# Patient Record
Sex: FEMALE | Race: WHITE | NOT HISPANIC OR LATINO | Employment: FULL TIME | URBAN - METROPOLITAN AREA
[De-identification: names, ages, dates, MRNs, and addresses within clinical notes are randomized per-mention and may not be internally consistent; named-entity substitution may affect disease eponyms.]

---
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
## 2019-07-14 ENCOUNTER — HOSPITAL ENCOUNTER (EMERGENCY)
Facility: HOSPITAL | Age: 19
Discharge: HOME | End: 2019-07-14
Attending: EMERGENCY MEDICINE | Admitting: EMERGENCY MEDICINE

## 2019-07-14 VITALS
BODY MASS INDEX: 42.94 KG/M2 | DIASTOLIC BLOOD PRESSURE: 96 MMHG | SYSTOLIC BLOOD PRESSURE: 152 MMHG | OXYGEN SATURATION: 98 % | RESPIRATION RATE: 16 BRPM | HEIGHT: 64 IN | HEART RATE: 49 BPM | WEIGHT: 251.5 LBS | TEMPERATURE: 98.2 F

## 2019-07-14 DIAGNOSIS — S39.012A STRAIN OF LUMBAR REGION, INITIAL ENCOUNTER: Primary | ICD-10-CM

## 2019-07-14 PROCEDURE — 99282 EMERGENCY DEPT VISIT SF MDM: CPT

## 2019-07-14 PROCEDURE — 99282 EMERGENCY DEPT VISIT SF MDM: CPT | Performed by: EMERGENCY MEDICINE

## 2019-07-14 RX ORDER — NABUMETONE 500 MG/1
500 TABLET, FILM COATED ORAL 2 TIMES DAILY PRN
Qty: 14 TABLET | Refills: 0 | Status: SHIPPED | OUTPATIENT
Start: 2019-07-14 | End: 2019-07-21

## 2019-07-14 RX ORDER — METHOCARBAMOL 750 MG/1
1500 TABLET, FILM COATED ORAL 3 TIMES DAILY PRN
Qty: 30 TABLET | Refills: 0 | Status: SHIPPED | OUTPATIENT
Start: 2019-07-14 | End: 2019-07-19

## 2019-07-14 NOTE — ED PROVIDER NOTES
Subjective   Bud Murray is a 18 yo WF who presents secondary to back injury.  Patient works at [employer].  She was pulling a cart of batteries behind her when she felt pain in her low back.  The pain continues to present.  It does not radiate down her legs.  Patient presents for evaluation.        History provided by:  Patient  Back Pain   Location:  Lumbar spine  Quality:  Aching  Radiates to:  Does not radiate  Pain severity:  Moderate  Onset quality:  Sudden  Duration:  1 hour  Timing:  Constant  Progression:  Unchanged  Chronicity:  New  Context comment:  As described above.  Relieved by:  None tried  Worsened by:  Bending  Ineffective treatments:  None tried  Associated symptoms: no abdominal pain, no bladder incontinence, no bowel incontinence, no chest pain, no dysuria, no fever, no headaches, no leg pain, no numbness, no paresthesias, no tingling and no weakness    Risk factors: lack of exercise and obesity    Risk factors: no hx of cancer, no hx of osteoporosis, not pregnant and no steroid use        Review of Systems   Constitutional: Negative for fever.   HENT: Negative for rhinorrhea.    Respiratory: Negative for cough.    Cardiovascular: Negative for chest pain.   Gastrointestinal: Negative for abdominal pain and bowel incontinence.   Genitourinary: Negative for bladder incontinence and dysuria.   Musculoskeletal: Positive for back pain.   Skin: Negative for color change.   Allergic/Immunologic: Negative for immunocompromised state.   Neurological: Negative for tingling, weakness, numbness, headaches and paresthesias.   All other systems reviewed and are negative.      Past Medical History:   Diagnosis Date   • Asthma    • History of gonorrhea    • Migraine    • Social anxiety disorder        No Known Allergies    Past Surgical History:   Procedure Laterality Date   • WISDOM TOOTH EXTRACTION         Family History   Problem Relation Age of Onset   • Stroke Father    • Hypertension Father    • Heart  disease Father    • Diabetes Father    • Ovarian cysts Mother    • Hypertension Mother    • Bipolar disorder Sister    • Heart disease Paternal Grandfather    • Heart disease Paternal Grandmother    • Bipolar disorder Paternal Grandmother    • Colon cancer Maternal Grandmother 63   • Heart disease Maternal Grandmother    • Heart disease Maternal Grandfather    • Breast cancer Maternal Aunt 24   • Ovarian cancer Other    • Ovarian cancer Other        Social History     Socioeconomic History   • Marital status: Single     Spouse name: Not on file   • Number of children: 0   • Years of education: Not on file   • Highest education level: Not on file   Tobacco Use   • Smoking status: Passive Smoke Exposure - Never Smoker   • Smokeless tobacco: Never Used   Substance and Sexual Activity   • Alcohol use: No   • Drug use: No   • Sexual activity: Not Currently     Birth control/protection: None   Social History Narrative    New gyn patient 8/8/18.           Objective   Physical Exam   Constitutional: She is oriented to person, place, and time. She appears well-developed and well-nourished. No distress.   19-year-old white female sitting in bed.  Patient is morbidly obese.  She appears in fair overall health.  Vital signs notable for blood pressure of 142/89 and pulse of 47.  Patient is accompanied by her mother.   HENT:   Head: Normocephalic and atraumatic.   Musculoskeletal:        Lumbar back: She exhibits decreased range of motion. She exhibits no tenderness, no bony tenderness, no swelling, no edema, no deformity and no spasm.   Neurological: She is alert and oriented to person, place, and time.   Skin: Skin is warm and dry. She is not diaphoretic.   Psychiatric: She has a normal mood and affect. Her behavior is normal.   Nursing note and vitals reviewed.      Procedures           ED Course  ED Course as of Jul 14 0705   Sun Jul 14, 2019   0353 Patient has not taken any Tylenol or ibuprofen for her low back pain.   "Patient's mother states that this was a work injury and that immediately after injury \"she came right here\".  The patient is nontender to palpation.  X-rays not indicated.  Will treat with NSAIDs and muscle relaxants.  [SS]      ED Course User Index  [SS] Puneet Meyer MD      My differential diagnosis for back pain includes but is not limited to:  Musculoskeletal strain, contusion, retroperitoneal hematoma, disc protrusion, vertebral fracture, transverse process fracture, rib fracture, facet syndrome, sacroiliac joint strain, sciatica, renal injury, splenic injury, pancreatic injury, osteoarthritis, lumbar spondylosis, spinal stenosis, ankylosing spondylitis, sacroiliac joint inflammation, pancreatitis, perforated peptic ulcer, diverticulitis, endometriosis, chronic PID, epidural abscess, osteomyelitis, retroperitoneal abscess, pyelonephritis, pneumonia, subphrenic abscess, tuberculosis, neurofibroma, meningioma, multiple myeloma, lymphoma, metastatic cancer, primary cancer, AAA, aortic dissection, spinal ischemia, referred pain, ureterolithiasis              MDM  Number of Diagnoses or Management Options  Strain of lumbar region, initial encounter: new and does not require workup  Risk of Complications, Morbidity, and/or Mortality  Presenting problems: low  Diagnostic procedures: minimal  Management options: moderate    Patient Progress  Patient progress: stable        Final diagnoses:   Strain of lumbar region, initial encounter            Puneet Meyer MD  07/14/19 0705    "

## 2019-07-14 NOTE — DISCHARGE INSTRUCTIONS
Medication as directed.  Apply heat to affected area.  Gentle stretching.  Follow-up with your PCP or spine specialist as above, sooner if needed.  Return to ED for worsening symptoms, medical emergencies.

## 2019-08-28 ENCOUNTER — OFFICE VISIT (OUTPATIENT)
Dept: OBSTETRICS AND GYNECOLOGY | Age: 19
End: 2019-08-28

## 2019-08-28 VITALS
BODY MASS INDEX: 41.66 KG/M2 | WEIGHT: 244 LBS | SYSTOLIC BLOOD PRESSURE: 130 MMHG | HEIGHT: 64 IN | DIASTOLIC BLOOD PRESSURE: 70 MMHG

## 2019-08-28 DIAGNOSIS — Z00.00 WELL WOMAN EXAM WITHOUT GYNECOLOGICAL EXAM: Primary | ICD-10-CM

## 2019-08-28 PROCEDURE — 99395 PREV VISIT EST AGE 18-39: CPT | Performed by: NURSE PRACTITIONER

## 2019-08-28 RX ORDER — NORETHINDRONE ACETATE AND ETHINYL ESTRADIOL 1; .02 MG/1; MG/1
1 TABLET ORAL DAILY
Qty: 90 TABLET | Refills: 1 | Status: SHIPPED | OUTPATIENT
Start: 2019-08-28 | End: 2020-01-22

## 2019-08-28 RX ORDER — NORETHINDRONE ACETATE AND ETHINYL ESTRADIOL 1; .02 MG/1; MG/1
1 TABLET ORAL DAILY
COMMUNITY
End: 2019-08-28 | Stop reason: SDUPTHER

## 2019-08-28 NOTE — PROGRESS NOTES
[facility]  Routine Annual Visit    2019    Patient: Bud Murray          MR#:[id number]      History of Present Illness    19 y.o. female  who presents with her mother for annual exam and for refill of oral contraceptives. Bud reports periods overall have greatly improved on the pill- her mother confirms this as well. Periods are now 3 days and light bleeding. Cramping has improved although is still moderate to severe first day of menses. Bud denies new medical hx. She does report her weight has fluctuated over the past yea- she is actively trying to lose weight and has had success over the past several months. She reports cutting out sodas and watching diet overall. She admits to little to no physical activity outside of work. Adding 30 minutes of physical activity 3-5 times per week was highly encouraged for weight loss and overall health and well being. Bud denies any past or current sexual activity.     One elevated BP populated in chart- this was taken while in ER with a back injury. Bud and her mother deny hx hypertension.     Patient's last menstrual period was 2019.  Obstetric History:  OB History      Para Term  AB Living    0 0 0 0 0 0    SAB TAB Ectopic Molar Multiple Live Births    0 0 0 0 0 0         Menstrual History:     Patient's last menstrual period was 2019.  Dysmenorrhea: (!) Severe    Sexual History:       ________________________________________  Patient Active Problem List   Diagnosis   • Obesity (BMI 30-39.9)   • Abnormal uterine bleeding (AUB)   • Dysmenorrhea   • PCOS (polycystic ovarian syndrome)       Past Medical History:   Diagnosis Date   • Asthma    • History of gonorrhea    • Migraine    • Social anxiety disorder        Past Surgical History:   Procedure Laterality Date   • WISDOM TOOTH EXTRACTION         Social History     Tobacco Use   Smoking Status Passive Smoke Exposure - Never Smoker   Smokeless Tobacco  Never Used       Family History   Problem Relation Age of Onset   • Stroke Father    • Hypertension Father    • Heart disease Father    • Diabetes Father    • Ovarian cysts Mother    • Hypertension Mother    • Bipolar disorder Sister    • Heart disease Paternal Grandfather    • Heart disease Paternal Grandmother    • Bipolar disorder Paternal Grandmother    • Colon cancer Maternal Grandmother 63   • Heart disease Maternal Grandmother    • Heart disease Maternal Grandfather    • Breast cancer Maternal Aunt 24   • Ovarian cancer Other    • Ovarian cancer Other        Prior to Admission medications    Medication Sig Start Date End Date Taking? Authorizing Provider   norethindrone-ethinyl estradiol (JUNEL 1/20) 1-20 MG-MCG per tablet Take 1 tablet by mouth Daily.   Yes Provider, Janeen, MD     The following portions of the patient's history were reviewed and updated as appropriate: allergies, current medications, past family history, past medical history, past social history, past surgical history and problem list.    Review of Systems   Constitutional: Negative.    HENT: Negative.    Eyes: Negative for visual disturbance.   Respiratory: Negative for cough, shortness of breath and wheezing.    Cardiovascular: Negative for chest pain, palpitations and leg swelling.   Gastrointestinal: Negative for abdominal distention, abdominal pain, blood in stool, constipation, diarrhea, nausea and vomiting.   Endocrine: Negative for cold intolerance and heat intolerance.   Genitourinary: Negative for difficulty urinating, dyspareunia, dysuria, frequency, genital sores, hematuria, menstrual problem, pelvic pain, urgency, vaginal bleeding, vaginal discharge and vaginal pain.   Musculoskeletal: Negative.    Skin: Negative.    Neurological: Negative for dizziness, weakness, light-headedness, numbness and headaches.   Hematological: Negative.    Psychiatric/Behavioral: Negative.    Breasts: negative for lumps skin changes, dimpling,  "swelling, nipple changes/discharge bilaterally         Objective   Physical Exam    /70   Ht 162.6 cm (64\")   Wt 111 kg (244 lb)   LMP 08/04/2019   Breastfeeding? No   BMI 41.88 kg/m²    BP Readings from Last 3 Encounters:   08/28/19 130/70   07/14/19 152/96   12/04/18 132/84      Wt Readings from Last 3 Encounters:   08/28/19 111 kg (244 lb)   07/14/19 114 kg (251 lb 8 oz)   12/04/18 106 kg (233 lb) (>99 %, Z= 2.35)*     * Growth percentiles are based on CDC (Girls, 2-20 Years) data.        BMI: Estimated body mass index is 41.88 kg/m² as calculated from the following:    Height as of this encounter: 162.6 cm (64\").    Weight as of this encounter: 111 kg (244 lb).            General:   alert, appears stated age and cooperative   Heart: regular rate and rhythm, S1, S2 normal, no murmur, click, rub or gallop   Lungs: clear to auscultation bilaterally   Abdomen: soft, non-tender, without masses or organomegaly   Breast: deferred   Vulva: deferred   Vagina: deferred    Cervix: deferred    Uterus: deferred   Adnexa: deferred     Assessment:    1. Normal annual exam   2. Contraception- doing well on OCPs and desires to continue. Risks, benefits, alternatives, and proper use reinforced. Offered to switch pill in effort to better control cramping but cheyene declines. States this pill is doing overall very well. She will follow up in 3 months to reassess OCP and weight loss.  3.  Counseled on healthy lifestyle modifications, safe sex, condom use, and self breast exams.        Plan:    []  Rx:   []  Mammogram request made  []  PAP done  []  Occult fecal blood test (Insure)  []  Labs:   []  GC/Chl/TV  []  DEXA scan   []  Referral for colonoscopy:           LUZ Elizabeth  8/28/2019 9:02 AM  "

## 2020-01-08 ENCOUNTER — OFFICE VISIT (OUTPATIENT)
Dept: OBSTETRICS AND GYNECOLOGY | Age: 20
End: 2020-01-08

## 2020-01-08 VITALS
WEIGHT: 248 LBS | SYSTOLIC BLOOD PRESSURE: 118 MMHG | HEIGHT: 64 IN | BODY MASS INDEX: 42.34 KG/M2 | DIASTOLIC BLOOD PRESSURE: 78 MMHG

## 2020-01-08 DIAGNOSIS — B37.31 YEAST VAGINITIS: Primary | ICD-10-CM

## 2020-01-08 PROCEDURE — 99213 OFFICE O/P EST LOW 20 MIN: CPT | Performed by: NURSE PRACTITIONER

## 2020-01-08 RX ORDER — NYSTATIN AND TRIAMCINOLONE ACETONIDE 100000; 1 [USP'U]/G; MG/G
OINTMENT TOPICAL 2 TIMES DAILY
Qty: 30 G | Refills: 0 | Status: SHIPPED | OUTPATIENT
Start: 2020-01-08

## 2020-01-08 NOTE — PROGRESS NOTES
Subjective   Bud Murray is a 19 y.o. female.     History of Present Illness     Bud presents with complaint of vaginal itching and redness x several weeks. She has not noticed an abnormal discharge. No pelvic pain, dysuria, fever. Continues to be abstinent.    She has not tried anything OTC.    No associated symptoms.    The following portions of the patient's history were reviewed and updated as appropriate: allergies, current medications, past family history, past medical history, past social history, past surgical history and problem list.    Review of Systems   Constitutional: Negative for chills, fatigue and fever.   Gastrointestinal: Negative for abdominal distention and abdominal pain.   Genitourinary: Positive for vaginal pain. Negative for decreased urine volume, dysuria, frequency, genital sores, hematuria, menstrual problem, pelvic pain, pelvic pressure, urgency, urinary incontinence, vaginal bleeding and vaginal discharge.       Objective   Physical Exam   Constitutional: She is oriented to person, place, and time. She appears well-developed and well-nourished. No distress.   Genitourinary: There is rash on the right labia. There is no tenderness, lesion or injury on the right labia. There is rash on the left labia. There is no tenderness, lesion or injury on the left labia. Vaginal discharge found.   Genitourinary Comments: Vulva and groin bilaterally with generalized erythema and inflammation consistent with candida. No excoriation or skin breakdown.   Neurological: She is alert and oriented to person, place, and time.   Skin: Skin is warm and dry.   Psychiatric: She has a normal mood and affect. Her behavior is normal.       Assessment/Plan   Bud was seen today for follow-up.    Diagnoses and all orders for this visit:    Yeast vaginitis    Other orders  -     nystatin-triamcinolone (MYCOLOG) 407532-4.1 UNIT/GM-% ointment; Apply  topically to the appropriate area as directed 2 (Two) Times  a Day.      History and exam consistent with external candida. mycolog cream sent to pharmacy. Proper use discussed. Preventative measures discussed in detail. She will follow up in 2 weeks to reassess.    LUZ Elizabeth

## 2020-01-22 ENCOUNTER — OFFICE VISIT (OUTPATIENT)
Dept: OBSTETRICS AND GYNECOLOGY | Age: 20
End: 2020-01-22

## 2020-01-22 VITALS
DIASTOLIC BLOOD PRESSURE: 80 MMHG | SYSTOLIC BLOOD PRESSURE: 120 MMHG | WEIGHT: 249 LBS | HEIGHT: 64 IN | BODY MASS INDEX: 42.51 KG/M2

## 2020-01-22 DIAGNOSIS — B37.31 CANDIDAL VULVITIS: Primary | ICD-10-CM

## 2020-01-22 PROCEDURE — 99213 OFFICE O/P EST LOW 20 MIN: CPT | Performed by: NURSE PRACTITIONER

## 2020-01-22 RX ORDER — DESOGESTREL AND ETHINYL ESTRADIOL 0.15-0.03
1 KIT ORAL DAILY
Qty: 90 TABLET | Refills: 2 | Status: SHIPPED | OUTPATIENT
Start: 2020-01-22 | End: 2020-09-17

## 2020-01-22 NOTE — PROGRESS NOTES
Subjective   Bud Murray is a 19 y.o. female.     History of Present Illness     Bud presents for follow up on external yeast infection. She reports compliance with the mycolog therapy and states itching, redness, and irritation have resolved. Prevention again reinforced.    Bud complains today of gradually worsening cramps on her OCP. She also reports if misses 1 pill she will have breakthrough bleeding.   She is wanting to try another pill. She has been on junel for several years.    Again reports no sexual activity in years. Continues abstinence.     The following portions of the patient's history were reviewed and updated as appropriate: allergies, current medications, past family history, past medical history, past social history, past surgical history and problem list.    Review of Systems   Constitutional: Negative for chills, fatigue and fever.   Genitourinary: Positive for menstrual problem. Negative for decreased urine volume, difficulty urinating, dyspareunia, dysuria, flank pain, frequency, genital sores, hematuria, pelvic pain, pelvic pressure, urgency, urinary incontinence, vaginal discharge and vaginal pain.       Objective   Physical Exam   Constitutional: She is oriented to person, place, and time. She appears well-developed and well-nourished. No distress.   Genitourinary: There is no rash, tenderness, lesion, injury or Bartholin's cyst on the right labia. There is no rash, tenderness, lesion, injury or Bartholin's cyst on the left labia.   Neurological: She is alert and oriented to person, place, and time.   Skin: Skin is warm and dry.   Psychiatric: She has a normal mood and affect. Her behavior is normal.         Assessment/Plan   Bud was seen today for follow-up.    Diagnoses and all orders for this visit:    Candidal vulvitis    Other orders  -     desogestrel-ethinyl estradiol (APRI) 0.15-30 MG-MCG per tablet; Take 1 tablet by mouth Daily.      Resolved candida  infection    Will switch OCP to [facility]pri. Call for persistent or new symptoms.    Follow up for annual exam and prn.    LUZ Elizabeth

## 2020-09-17 RX ORDER — DESOGESTREL AND ETHINYL ESTRADIOL 0.15-0.03
KIT ORAL
Qty: 84 TABLET | Refills: 0 | Status: SHIPPED | OUTPATIENT
Start: 2020-09-17

## 2020-12-18 RX ORDER — DESOGESTREL AND ETHINYL ESTRADIOL 0.15-0.03
KIT ORAL
Qty: 84 TABLET | Refills: 0 | OUTPATIENT
Start: 2020-12-18